# Patient Record
Sex: MALE | Race: WHITE | NOT HISPANIC OR LATINO | Employment: OTHER | URBAN - METROPOLITAN AREA
[De-identification: names, ages, dates, MRNs, and addresses within clinical notes are randomized per-mention and may not be internally consistent; named-entity substitution may affect disease eponyms.]

---

## 2018-12-03 ENCOUNTER — OFFICE VISIT (OUTPATIENT)
Dept: GASTROENTEROLOGY | Facility: CLINIC | Age: 38
End: 2018-12-03

## 2018-12-03 VITALS
DIASTOLIC BLOOD PRESSURE: 78 MMHG | WEIGHT: 245 LBS | SYSTOLIC BLOOD PRESSURE: 126 MMHG | TEMPERATURE: 98.1 F | HEIGHT: 75 IN | BODY MASS INDEX: 30.46 KG/M2

## 2018-12-03 DIAGNOSIS — R14.0 ABDOMINAL DISTENSION: ICD-10-CM

## 2018-12-03 DIAGNOSIS — Z86.19 HEPATITIS C VIRUS INFECTION CURED AFTER ANTIVIRAL DRUG THERAPY: Primary | ICD-10-CM

## 2018-12-03 DIAGNOSIS — R60.0 LOWER EXTREMITY EDEMA: ICD-10-CM

## 2018-12-03 PROBLEM — K74.4 SECONDARY BILIARY CIRRHOSIS (HCC): Status: ACTIVE | Noted: 2018-12-03

## 2018-12-03 LAB
ALBUMIN SERPL-MCNC: 4.7 G/DL (ref 3.5–5.2)
ALBUMIN/GLOB SERPL: 1.6 G/DL
ALP SERPL-CCNC: 55 U/L (ref 39–117)
ALT SERPL-CCNC: 16 U/L (ref 1–41)
AST SERPL-CCNC: 21 U/L (ref 1–40)
BASOPHILS # BLD MANUAL: 0 10*3/MM3 (ref 0–0.2)
BASOPHILS NFR BLD MANUAL: 0 % (ref 0–1.5)
BILIRUB SERPL-MCNC: 0.2 MG/DL (ref 0.1–1.2)
BUN SERPL-MCNC: 20 MG/DL (ref 6–20)
BUN/CREAT SERPL: 15.4 (ref 7–25)
CALCIUM SERPL-MCNC: 9.7 MG/DL (ref 8.6–10.5)
CHLORIDE SERPL-SCNC: 98 MMOL/L (ref 98–107)
CO2 SERPL-SCNC: 28.8 MMOL/L (ref 22–29)
CREAT SERPL-MCNC: 1.3 MG/DL (ref 0.76–1.27)
DIFFERENTIAL COMMENT: ABNORMAL
EOSINOPHIL # BLD MANUAL: 0.06 10*3/MM3 (ref 0–0.7)
EOSINOPHIL NFR BLD MANUAL: 2 % (ref 0.3–6.2)
ERYTHROCYTE [DISTWIDTH] IN BLOOD BY AUTOMATED COUNT: 14.8 % (ref 11.5–14.5)
GLOBULIN SER CALC-MCNC: 2.9 GM/DL
GLUCOSE SERPL-MCNC: 97 MG/DL (ref 65–99)
HCT VFR BLD AUTO: 36.3 % (ref 40.4–52.2)
HGB BLD-MCNC: 10.9 G/DL (ref 13.7–17.6)
INR PPP: 1.07 (ref 0.9–1.1)
LYMPHOCYTES # BLD MANUAL: 1.76 10*3/MM3 (ref 0.9–4.8)
LYMPHOCYTES NFR BLD MANUAL: 55 % (ref 19.6–45.3)
MCH RBC QN AUTO: 27.4 PG (ref 27–32.7)
MCHC RBC AUTO-ENTMCNC: 30 G/DL (ref 32.6–36.4)
MCV RBC AUTO: 91.2 FL (ref 79.8–96.2)
MONOCYTES # BLD MANUAL: 0.19 10*3/MM3 (ref 0.2–1.2)
MONOCYTES NFR BLD MANUAL: 6 % (ref 5–12)
NEUTROPHILS # BLD MANUAL: 1.18 10*3/MM3 (ref 1.9–8.1)
NEUTROPHILS NFR BLD MANUAL: 37 % (ref 42.7–76)
PLATELET # BLD AUTO: 206 10*3/MM3 (ref 140–500)
PLATELET BLD QL SMEAR: ABNORMAL
POTASSIUM SERPL-SCNC: 4.6 MMOL/L (ref 3.5–5.2)
PROT SERPL-MCNC: 7.6 G/DL (ref 6–8.5)
PROTHROMBIN TIME: 13.7 SECONDS (ref 11.7–14.2)
RBC # BLD AUTO: 3.98 10*6/MM3 (ref 4.6–6)
RBC MORPH BLD: ABNORMAL
SODIUM SERPL-SCNC: 140 MMOL/L (ref 136–145)
WBC # BLD AUTO: 3.2 10*3/MM3 (ref 4.5–10.7)

## 2018-12-03 PROCEDURE — 99204 OFFICE O/P NEW MOD 45 MIN: CPT | Performed by: INTERNAL MEDICINE

## 2018-12-03 RX ORDER — ERGOCALCIFEROL 1.25 MG/1
CAPSULE ORAL
Refills: 3 | COMMUNITY
Start: 2018-11-22

## 2018-12-03 RX ORDER — PROMETHAZINE HYDROCHLORIDE 25 MG/1
TABLET ORAL
Refills: 1 | COMMUNITY
Start: 2018-10-01

## 2018-12-03 RX ORDER — LISINOPRIL 40 MG/1
TABLET ORAL
Refills: 3 | COMMUNITY
Start: 2018-11-19

## 2018-12-03 RX ORDER — DIAZEPAM 10 MG/1
TABLET ORAL
Refills: 0 | COMMUNITY
Start: 2018-11-28

## 2018-12-03 RX ORDER — HYDROCODONE BITARTRATE AND ACETAMINOPHEN 10; 325 MG/1; MG/1
1 TABLET ORAL 4 TIMES DAILY
Refills: 0 | COMMUNITY
Start: 2018-11-28

## 2018-12-03 RX ORDER — FUROSEMIDE 40 MG/1
40 TABLET ORAL DAILY
Refills: 0 | COMMUNITY
Start: 2018-11-09

## 2018-12-03 RX ORDER — SPIRONOLACTONE 100 MG/1
100 TABLET, FILM COATED ORAL 2 TIMES DAILY
Refills: 0 | COMMUNITY
Start: 2018-11-09

## 2018-12-03 RX ORDER — GABAPENTIN 800 MG/1
800 TABLET ORAL 3 TIMES DAILY
COMMUNITY
Start: 2014-11-10

## 2018-12-03 RX ORDER — POTASSIUM CHLORIDE 1500 MG/1
20 TABLET, FILM COATED, EXTENDED RELEASE ORAL DAILY
Refills: 2 | COMMUNITY
Start: 2018-11-09

## 2018-12-03 RX ORDER — FENOFIBRATE 54 MG/1
54 TABLET ORAL DAILY
Refills: 2 | COMMUNITY
Start: 2018-11-22

## 2018-12-03 RX ORDER — IBUPROFEN 800 MG/1
TABLET ORAL
Refills: 3 | COMMUNITY
Start: 2018-11-18

## 2018-12-03 RX ORDER — METOPROLOL TARTRATE 100 MG/1
100 TABLET ORAL
COMMUNITY
Start: 2014-01-03

## 2018-12-03 RX ORDER — PANTOPRAZOLE SODIUM 40 MG/1
40 TABLET, DELAYED RELEASE ORAL DAILY
Refills: 3 | COMMUNITY
Start: 2018-11-10

## 2018-12-03 RX ORDER — VENLAFAXINE HYDROCHLORIDE 150 MG/1
TABLET, EXTENDED RELEASE ORAL
Refills: 0 | COMMUNITY
Start: 2018-11-21

## 2018-12-03 NOTE — PATIENT INSTRUCTIONS
Labs today    Schedule the liver ultrasound    Low sodium diet-- 2000mg only    For any additional questions, concerns or changes to your condition after today's office visit please contact the office at [phone].
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
## 2018-12-04 ENCOUNTER — TELEPHONE (OUTPATIENT)
Dept: GASTROENTEROLOGY | Facility: CLINIC | Age: 38
End: 2018-12-04

## 2018-12-04 NOTE — TELEPHONE ENCOUNTER
Called Dr Gustavo Dillard's office at [phone] and spoke with Lori and requested labs and any imaging be faxed to [phone].

## 2018-12-04 NOTE — TELEPHONE ENCOUNTER
----- Message from Adelina Lu MD sent at 12/3/2018  4:26 PM EST -----  Can we pls get records from Dr Gustavo Dillard office-- labs and any imaging, thanks

## 2018-12-04 NOTE — PROGRESS NOTES
Labs show a mild anemia, mildly low white blood cell count.  His platelets are normal.  His liver tests are all normal.  His kidney function is worsening compared to labs we have from a year ago.  I will compared this to recent labs with his PCP.    Will follow-up the results of his liver ultrasound.

## 2018-12-05 ENCOUNTER — TELEPHONE (OUTPATIENT)
Dept: GASTROENTEROLOGY | Facility: CLINIC | Age: 38
End: 2018-12-05

## 2018-12-05 NOTE — TELEPHONE ENCOUNTER
Call to # listed - wrong #.      Call to [facility] @ [phone].  Correct # for pt is .  Call to that # - identifies as Vicente Mckee.  VM left with request to contact office.    Phone # corrected in epic.

## 2018-12-05 NOTE — TELEPHONE ENCOUNTER
----- Message from Adelina Lu MD sent at 12/5/2018 10:44 AM EST -----  He sees FAVIOLA Calderon-- says he has had labs with her, can we get those records pls, I don't see anything in his chart, thx

## 2018-12-05 NOTE — TELEPHONE ENCOUNTER
Erika Calderon prior APRN at this office.  Per [employer], no new # listed.    VM to pt with request to contact office.  Checking if pt has seen Erika Calderon recently at another office.  (see also noted of 12/4).

## 2018-12-05 NOTE — TELEPHONE ENCOUNTER
----- Message from Adelina Lu MD sent at 12/4/2018  6:19 PM EST -----  Labs show a mild anemia, mildly low white blood cell count.  His platelets are normal.  His liver tests are all normal.  His kidney function is worsening compared to labs we have from a year ago.  I will compared this to recent labs with his PCP.    Will follow-up the results of his liver ultrasound.

## 2018-12-06 NOTE — TELEPHONE ENCOUNTER
Records received - lab requisitions, not results.  Call to Dr BHATTI's office and spoke with Usha.  Request lab results be faxed to [phone].

## 2018-12-06 NOTE — TELEPHONE ENCOUNTER
Call from pt.  States Erika Calderon is with PCP office.  Call to DR Aguero's office @ [phone] and spoke with Marianne.  Request labs be faxed to [phone].

## 2018-12-10 NOTE — TELEPHONE ENCOUNTER
To follow-up with his primary care physician about the swelling.  It does not appear to be a liver-related issue.  It appears to be more related to his kidney function versus cardiac function.

## 2018-12-10 NOTE — TELEPHONE ENCOUNTER
Called pt and advised per Dr Lu his labs show a mild anemia, mildly low white blood cell count. His platelets are normal.  His liver tests are all normal.  His kidney function is worsening compared to labs we have from a year ago.  She will compare this to recent labs from pcp.  She will f/u the results of liver us.     Pt verb understanding and reports he does not have liver us scheduled. Gave pt's number to [facility] scheduling [phone] so he can get this arranged.  Also pt reports he is having more abd swelling and ankle and feet swelling.  He states it is difficult to move around.  Also pt states his left elbow is very swollen. Pt denies any SOA.  Advised pt would send message to Dr Lu and in the meantime if symptoms worsen to go to ER.  Pt verb understanding.

## 2018-12-11 NOTE — TELEPHONE ENCOUNTER
Called pt and advised per Dr Lu that he should b/u with his pcp about the swelling.  It does not appear to be a liver related issue. It appears to be more related to his kidney function vs cardiac function.     Pt verb understanding. Pt states he has not yet scheduled the us of the liver. Pt states he is miserable and feels like he needs to have the fluid drained.  He states he weighed yesterday and his weight is 245lb (last weight 245).  Advised pt would send message to Dr Lu.

## 2018-12-12 NOTE — TELEPHONE ENCOUNTER
Called pt and on identified vm advised per Dr Lu that he needs to go to the ER.  Otherwise schedule the liver us.Advised to call with questions.

## 2018-12-13 NOTE — TELEPHONE ENCOUNTER
Looks like he had labs with Dr. Turner but we did not get those results in his fax.  We please call my office and try to get that again.    Also check for recent labs at Dr Aguero's office pls, thanks

## 2018-12-13 NOTE — TELEPHONE ENCOUNTER
Pt called and reports that he did get his liver us scheduled for Saturday 12/13.  Advised would update Dr Lu.

## 2018-12-14 NOTE — TELEPHONE ENCOUNTER
Called Dr Turner's office at [phone] and spoke with Lori and requested labs be faxed to [phone].     Also called Dr Aguero at [phone] and spoke with Susie and requested labs be faxed to [phone].  She reports that the only labs they have are from the [facility] but she will fax them.

## 2018-12-15 ENCOUNTER — APPOINTMENT (OUTPATIENT)
Dept: ULTRASOUND IMAGING | Facility: HOSPITAL | Age: 38
End: 2018-12-15
Attending: INTERNAL MEDICINE

## 2018-12-23 ENCOUNTER — HOSPITAL ENCOUNTER (OUTPATIENT)
Dept: ULTRASOUND IMAGING | Facility: HOSPITAL | Age: 38
End: 2018-12-23
Attending: INTERNAL MEDICINE

## 2018-12-29 ENCOUNTER — HOSPITAL ENCOUNTER (OUTPATIENT)
Dept: ULTRASOUND IMAGING | Facility: HOSPITAL | Age: 38
End: 2018-12-29
Attending: INTERNAL MEDICINE

## 2019-01-03 ENCOUNTER — HOSPITAL ENCOUNTER (OUTPATIENT)
Dept: ULTRASOUND IMAGING | Facility: HOSPITAL | Age: 39
Discharge: HOME | End: 2019-01-03
Attending: INTERNAL MEDICINE | Admitting: INTERNAL MEDICINE

## 2019-01-03 DIAGNOSIS — R14.0 ABDOMINAL DISTENSION: ICD-10-CM

## 2019-01-03 DIAGNOSIS — Z86.19 HEPATITIS C VIRUS INFECTION CURED AFTER ANTIVIRAL DRUG THERAPY: ICD-10-CM

## 2019-01-03 PROCEDURE — 76705 ECHO EXAM OF ABDOMEN: CPT

## 2019-01-09 ENCOUNTER — TELEPHONE (OUTPATIENT)
Dept: GASTROENTEROLOGY | Facility: CLINIC | Age: 39
End: 2019-01-09

## 2019-01-09 NOTE — TELEPHONE ENCOUNTER
Call returned to pt.  Advise per DR Lu that US shows liver mildly enlarge, but overall normal in appearance.  Does not appear that has cirrhosis.  Pt verb understanding.

## 2019-01-09 NOTE — TELEPHONE ENCOUNTER
----- Message from Adelina Lu MD sent at 1/7/2019  9:28 PM EST -----  The is mildly enlarged but overall normal in appearance.  It does not appear that he has cirrhosis.